# Patient Record
(demographics unavailable — no encounter records)

---

## 2025-03-11 NOTE — DISCUSSION/SUMMARY
[Parental Well-Being] : parental well-being [Family Adjustment] : family adjustment [Feeding Routines] : feeding routines [Infant Adjustment] : infant adjustment [Safety] : safety [Age Approp Vaccines] : Age appropriate vaccines administered [Parent/Guardian] : Parent/Guardian [Parental Concerns Addressed] : Parental concerns addressed [] : The components of the vaccine(s) to be administered today are listed in the plan of care. The disease(s) for which the vaccine(s) are intended to prevent and the risks have been discussed with the caretaker.  The risks are also included in the appropriate vaccination information statements which have been provided to the patient's caregiver.  The caregiver has given consent to vaccinate. [FreeTextEntry1] : resources provided for Mom . denies SI/ HI   independent historian parent  Records reviewed. Fully counseled. All questions and concerns addressed.

## 2025-03-11 NOTE — HISTORY OF PRESENT ILLNESS
[Parents] : parents [Breast milk] : breast milk [Normal] : Normal [In Bassinet/Crib] : sleeps in bassinet/crib [On back] : sleeps on back [No] : No cigarette smoke exposure [Water heater temperature set at <120 degrees F] : Water heater temperature set at <120 degrees F [Rear facing car seat in back seat] : Rear facing car seat in back seat [Carbon Monoxide Detectors] : Carbon monoxide detectors at home [Smoke Detectors] : Smoke detectors at home. [At risk for exposure to TB] : Not at risk for exposure to Tuberculosis  [de-identified] : 2.5oz  [NO] : No [FreeTextEntry1] : PT HERE FOR 1 MO [place] - HEP B #2  DOING WELL OVERALL  BREASTMILK + ENFAMIL GENTLEASE LIQUID

## 2025-03-11 NOTE — PHYSICAL EXAM
[Alert] : alert [Acute Distress] : no acute distress [Normocephalic] : normocephalic [Flat Open Anterior [place]] : flat open anterior fontanelle [PERRL] : PERRL [Red Reflex Bilateral] : red reflex bilateral [Normally Placed Ears] : normally placed ears [Auricles Well Formed] : auricles well formed [Clear Tympanic membranes] : clear tympanic membranes [Light reflex present] : light reflex present [Bony landmarks visible] : bony landmarks visible [Discharge] : no discharge [Nares Patent] : nares patent [Palate Intact] : palate intact [Uvula Midline] : uvula midline [Supple, full passive range of motion] : supple, full passive range of motion [Palpable Masses] : no palpable masses [Symmetric Chest Rise] : symmetric chest rise [Clear to Auscultation Bilaterally] : clear to auscultation bilaterally [Regular Rate and Rhythm] : regular rate and rhythm [S1, S2 present] : S1, S2 present [Murmurs] : no murmurs [+2 Femoral Pulses] : +2 femoral pulses [Soft] : soft [Tender] : nontender [Distended] : not distended [Bowel Sounds] : bowel sounds present [Hepatomegaly] : no hepatomegaly [Splenomegaly] : no splenomegaly [Normal external genitailia] : normal external genitalia [Central Urethral Opening] : central urethral opening [Testicles Descended Bilaterally] : testicles descended bilaterally [Normally Placed] : normally placed [No Abnormal Lymph Nodes Palpated] : no abnormal lymph nodes palpated [Mills-Ortolani] : negative Mills-Ortolani [Symmetric Flexed Extremities] : symmetric flexed extremities [Spinal Dimple] : no spinal dimple [Tuft of Hair] : no tuft of hair [Startle Reflex] : startle reflex present [Suck Reflex] : suck reflex present [Rooting] : rooting reflex present [Palmar Grasp] : palmar grasp reflex present [Plantar Grasp] : plantar grasp reflex present [Symmetric Claire] : symmetric [place] [Jaundice] : no jaundice [Rash and/or lesion present] : no rash/lesion

## 2025-03-27 NOTE — HISTORY OF PRESENT ILLNESS
[FreeTextEntry6] : PT IS 57DAYS OLD, HERE WITH MOM  PT IS HERE FOR SICK VISIT C/O ORAL THRUSH, & COUGH NO TEMP BUT MOM REPORTS WARM TO TOUCH  MOM NOTICED THRUSH 2-3 DAYS AGO

## 2025-03-27 NOTE — DISCUSSION/SUMMARY
[FreeTextEntry1] : White plaques likely oral thrush. Recommend nystatin up to 4 times per day. Sterilize bottles and nipples.   Discussed findings. F/U if symptoms persist or worsen  Counseled fully.  Prework: Reviewed prior notes, reports, and results.   Independent historian; parent.

## 2025-04-01 NOTE — PHYSICAL EXAM
[Alert] : alert [Acute Distress] : no acute distress [Normocephalic] : normocephalic [Flat Open Anterior [place]] : flat open anterior fontanelle [PERRL] : PERRL [Red Reflex Bilateral] : red reflex bilateral [Normally Placed Ears] : normally placed ears [Auricles Well Formed] : auricles well formed [Clear Tympanic membranes] : clear tympanic membranes [Light reflex present] : light reflex present [Bony landmarks visible] : bony landmarks visible [Discharge] : no discharge [Nares Patent] : nares patent [Palate Intact] : palate intact [Uvula Midline] : uvula midline [Supple, full passive range of motion] : supple, full passive range of motion [Palpable Masses] : no palpable masses [Symmetric Chest Rise] : symmetric chest rise [Clear to Auscultation Bilaterally] : clear to auscultation bilaterally [Regular Rate and Rhythm] : regular rate and rhythm [S1, S2 present] : S1, S2 present [Murmurs] : no murmurs [+2 Femoral Pulses] : +2 femoral pulses [Soft] : soft [Tender] : nontender [Distended] : not distended [Bowel Sounds] : bowel sounds present [Hepatomegaly] : no hepatomegaly [Splenomegaly] : no splenomegaly [Normal external genitailia] : normal external genitalia [Central Urethral Opening] : central urethral opening [Testicles Descended Bilaterally] : testicles descended bilaterally [Normally Placed] : normally placed [No Abnormal Lymph Nodes Palpated] : no abnormal lymph nodes palpated [Mills-Ortolani] : negative Mills-Ortolani [Symmetric Flexed Extremities] : symmetric flexed extremities [Spinal Dimple] : no spinal dimple [Tuft of Hair] : no tuft of hair [Startle Reflex] : startle reflex present [Suck Reflex] : suck reflex present [Rooting] : rooting reflex present [Palmar Grasp] : palmar grasp reflex present [Plantar Grasp] : plantar grasp reflex present [Symmetric Claire] : symmetric [place] [Rash and/or lesion present] : no rash/lesion [de-identified] : +white plaques on tongue

## 2025-04-01 NOTE — HISTORY OF PRESENT ILLNESS
[Mother] : mother [Breast milk] : breast milk [Normal] : Normal [In Bassinet/Crib] : sleeps in bassinet/crib [On back] : sleeps on back [No] : No cigarette smoke exposure [Water heater temperature set at <120 degrees F] : Water heater temperature set at <120 degrees F [Rear facing car seat in back seat] : Rear facing car seat in back seat [Carbon Monoxide Detectors] : Carbon monoxide detectors at home [Smoke Detectors] : Smoke detectors at home. [At risk for exposure to TB] : Not at risk for exposure to Tuberculosis  [de-identified] : 3oz  [NO] : No [FreeTextEntry1] : PT HERE FOR 2 MO [place] - PENTACEL AND ROTAVIRUS DOING WELL OVERALL

## 2025-04-01 NOTE — DISCUSSION/SUMMARY
[Parental (Maternal) Well-Being] : parental (maternal) well-being [Infant-Family Synchrony] : infant-family synchrony [Nutritional Adequacy] : nutritional adequacy [Infant Behavior] : infant behavior [Safety] : safety [Parental Concerns Addressed] : Parental concerns addressed [] : The components of the vaccine(s) to be administered today are listed in the plan of care. The disease(s) for which the vaccine(s) are intended to prevent and the risks have been discussed with the caretaker.  The risks are also included in the appropriate vaccination information statements which have been provided to the patient's caregiver.  The caregiver has given consent to vaccinate. [FreeTextEntry1] :  independent historian parent  Records reviewed. Fully counseled. All questions and concerns addressed.  continue vitamin d drops

## 2025-04-29 NOTE — PHYSICAL EXAM
[Cerumen in canal] : cerumen in canal [Mucoid Discharge] : mucoid discharge [NL] : nonerythematous oropharynx [FreeTextEntry3] : WAX REMOVAL PERFORMED. BLOOD IN RIGHT CANAL NOTED  [de-identified] : NOTED YELLOWISH BUILD UP UNDER LEFT INDEX FINGERNAIL. REF DERM

## 2025-04-29 NOTE — DISCUSSION/SUMMARY
[FreeTextEntry1] : Counseled fully. PREWORK: Reviewed prior notes, reports, and results. Independent historian; parent.  Patient presents with parent for sick visit c/o FEVER, COUGH AND CONGESTION  *MOM POINTED OUT INDEX FINGERNAIL WITH YELLOWISH BUILDUP UNDERNEATH. REPORTS BEING THERE FOR MONTHS. REF DERM  RAPID FLU: NEGATIVE  SWABBED RVP IN OFFICE  WILL FOLLOW UP WITH RESULTS IN 48 HOURS  100 percent physician-engaged. Full ventilation. [facility] endorsed Isolation precautions. PPE in use.  Advised to continue monitoring temperature and treat PRN with Tylenol or Motrin.  Ensure adequate hydration with Pedialyte or Gatorade. Keep patient comfortable.  Will be contagious until 24hrs fever free.   Symptoms likely due to viral URI. Recommend supportive care including antipyretics, fluids, and nasal saline followed by nasal suction. Return if symptoms worsen or persist.  REC CONTINUE WITH SUPPORTIVE CARE.

## 2025-05-02 NOTE — HISTORY OF PRESENT ILLNESS
[Parents] : parents [Breast milk] : breast milk [Formula ___ oz/feed] : [unfilled] oz of formula per feed [Hours between feeds ___] : Child is fed every [unfilled] hours [Normal] : Normal [Frequency of stools: ___] : Frequency of stools: [unfilled]  stools [In Bassinet/Crib] : sleeps in bassinet/crib [On back] : sleeps on back [Co-sleeping] : co-sleeping [Pacifier use] : Pacifier use [Rear facing car seat in back seat] : Rear facing car seat in back seat [Carbon Monoxide Detectors] : Carbon monoxide detectors at home [Smoke Detectors] : Smoke detectors at home. [NO] : No [At risk for exposure to TB] : Not at risk for exposure to Tuberculosis  [de-identified] : No concerns [de-identified] : Prevnar [FreeTextEntry1] : PT IS HERE WITH MOM AND DAD   PT IS HERE FOR 3 MONTH [place]---> Prevnar  NO FEVER  CONGESTION  COUGH  PT WAS IN OFFICE 4/29 ALL TESTS DONE IN OFFICE WAS NEGATIVE   EATING SLEEPING POOPING NORMAL   ENFAMIL GENTLEASE + BREAST MILK

## 2025-05-02 NOTE — PHYSICAL EXAM
[Alert] : alert [Normocephalic] : normocephalic [Flat Open Anterior [place]] : flat open anterior fontanelle [PERRL] : PERRL [Red Reflex Bilateral] : red reflex bilateral [Normally Placed Ears] : normally placed ears [Auricles Well Formed] : auricles well formed [Clear Tympanic membranes] : clear tympanic membranes [Light reflex present] : light reflex present [Bony landmarks visible] : bony landmarks visible [Nares Patent] : nares patent [Palate Intact] : palate intact [Uvula Midline] : uvula midline [Supple, full passive range of motion] : supple, full passive range of motion [Symmetric Chest Rise] : symmetric chest rise [Clear to Auscultation Bilaterally] : clear to auscultation bilaterally [Regular Rate and Rhythm] : regular rate and rhythm [S1, S2 present] : S1, S2 present [+2 Femoral Pulses] : +2 femoral pulses [Soft] : soft [Bowel Sounds] : bowel sounds present [Normal external genitalia] : normal external genitalia [Central Urethral Opening] : central urethral opening [Testicles Descended Bilaterally] : testicles descended bilaterally [Normally Placed] : normally placed [No Abnormal Lymph Nodes Palpated] : no abnormal lymph nodes palpated [Symmetric Flexed Extremities] : symmetric flexed extremities [Startle Reflex] : startle reflex present [Suck Reflex] : suck reflex present [Rooting] : rooting reflex present [Palmar Grasp] : palmar grasp reflex present [Plantar Grasp] : plantar grasp reflex present [Symmetric [place]] : symmetric [place] [Acute Distress] : no acute distress [Discharge] : no discharge [Palpable Masses] : no palpable masses [Murmurs] : no murmurs [Tender] : nontender [Distended] : not distended [Hepatomegaly] : no hepatomegaly [Splenomegaly] : no splenomegaly [Mills-Ortolani] : negative Mills-Ortolani [Spinal Dimple] : no spinal dimple [Tuft of Hair] : no tuft of hair [Rash and/or lesion present] : no rash/lesion

## 2025-05-02 NOTE — DISCUSSION/SUMMARY
[Normal Growth] : growth [Normal Development] : development  [No Elimination Concerns] : elimination [Continue Regimen] : feeding [No Skin Concerns] : skin [Normal Sleep Pattern] : sleep [None] : no medical problems [Anticipatory Guidance Given] : Anticipatory guidance addressed as per the history of present illness section [Age Approp Vaccines] : Age appropriate vaccines administered [No Medications] : ~He/She~ is not on any medications [Parent/Guardian] : Parent/Guardian [] : The components of the vaccine(s) to be administered today are listed in the plan of care. The disease(s) for which the vaccine(s) are intended to prevent and the risks have been discussed with the caretaker.  The risks are also included in the appropriate vaccination information statements which have been provided to the patient's caregiver.  The caregiver has given consent to vaccinate. [Parental (Maternal) Well-Being] : parental (maternal) well-being [Infant-Family Synchrony] : infant-family synchrony [Nutritional Adequacy] : nutritional adequacy [Infant Behavior] : infant behavior [Safety] : safety [FreeTextEntry1] : Recommend exclusive breastfeeding, 8-12 feedings per day. Mother should continue prenatal vitamins and avoid alcohol. If formula is needed, recommend iron-fortified formulations, 2-4 oz every 3-4 hrs. When in car, patient should be in rear-facing car seat in back seat. Put baby to sleep on back, in own crib with no loose or soft bedding. Help baby to maintain sleep and feeding routines. May offer pacifier if needed. Continue tummy time when awake. Parents counseled to call if rectal temperature >100.4 degrees F.  Symptoms likely due to viral URI. Recommend supportive care including antipyretics, fluids, and nasal saline followed by nasal suction. Return if symptoms worsen or persist.   Discussed findings. F/U if symptoms persist or worsen  Counseled fully.  Prework: Reviewed prior notes, reports, and results.   Independent historian; parent.

## 2025-05-02 NOTE — DEVELOPMENTAL MILESTONES
[Normal Development] : Normal Development [None] : none [Smiles responsively] : smiles responsively [Vocalizes with simple cooing] : vocalizes with simple cooing [Lifts head and chest in prone] : lifts head and chest in prone [Opens and shuts hands] : opens and shuts hands [Laughs aloud] : laughs aloud [Turns to voice] : turns to voice [Vocalizes with extending cooing] : vocalizes with extending cooing [Keeps hands unfisted] : keeps hands unfisted

## 2025-05-09 NOTE — PHYSICAL EXAM
[NL] : no acute distress, alert [Clear] : right tympanic membrane clear [FreeTextEntry3] : Rt external canal with excessive dark ear wax, no scabs or bleeding

## 2025-05-09 NOTE — DISCUSSION/SUMMARY
[FreeTextEntry1] : reassured Mom that ear wax is dark and its not blood.  don't use q tips or place anything in the ears    independent historian parent  Records reviewed. Fully counseled. All questions and concerns addressed.

## 2025-05-09 NOTE — HISTORY OF PRESENT ILLNESS
[FreeTextEntry6] : PT IS HERE WITH MOM  PT IS HERE FOR BLEEDING IN LEFT EAR  MOM STATES THIS HAS BEEN GOING ON FOR A FEW DAYS  NO FEVER  MOM STATES DR KNIGHT USED A COTTON SWAB IN THE EARS AND WHEN PT GOT HOME LEFT EAR WAS BLEEDING

## 2025-05-16 NOTE — HISTORY OF PRESENT ILLNESS
[FreeTextEntry1] : NPV nail changes [de-identified] : NORM CHOWDHURY is a 3 month old who is presenting for evaluation of:   1. Changes to the left 2nd finger nail. Ongoing for at least 1 month. Previously was red. Parents are unclear if there was a preceding infection, deny injury. No pets at home. No exposure to tinea. Baby is not bothered by fingernail.

## 2025-05-16 NOTE — PHYSICAL EXAM
[Alert] : alert [Well Nourished] : well nourished [Conjunctiva Non-injected] : conjunctiva non-injected [No Visual Lymphadenopathy] : no visual  lymphadenopathy [No Clubbing] : no clubbing [No Edema] : no edema [No Bromhidrosis] : no bromhidrosis [No Chromhidrosis] : no chromhidrosis [FreeTextEntry3] : left 2nd fingernail with thickening and ragged edge and yellowing discoloration all other fingers and toes normal

## 2025-05-16 NOTE — CONSULT LETTER
[Dear  ___] : Dear  [unfilled], [Consult Letter:] : I had the pleasure of evaluating your patient, [unfilled]. [Please see my note below.] : Please see my note below. [Consult Closing:] : Thank you very much for allowing me to participate in the care of this patient.  If you have any questions, please do not hesitate to contact me. [Sincerely,] : Sincerely, [FreeTextEntry3] : Yani Scales MD Pediatric Dermatology [facility]

## 2025-05-16 NOTE — ASSESSMENT
[Use of independent historian: [ enter independent historian's relationship to patient ] :____] : As the patient was unable to provide a complete and reliable history, I obtained clinical history from the patient's [unfilled] [FreeTextEntry1] : #Onychodystrophy- sometime can be seen with trauma or prior candida Nail clipping and fungal swab obtained to r/o onychomycosis Start nystatin ointment BID to AA   xerosis Education and anticipatory guidance provided. Dry skin care handout reviewed  RTC 1 month

## 2025-05-16 NOTE — HISTORY OF PRESENT ILLNESS
[FreeTextEntry1] : NPV nail changes [de-identified] : NORM CHOWDHURY is a 3 month old who is presenting for evaluation of:   1. Changes to the left 2nd finger nail. Ongoing for at least 1 month. Previously was red. Parents are unclear if there was a preceding infection, deny injury. No pets at home. No exposure to tinea. Baby is not bothered by fingernail.

## 2025-05-30 NOTE — HISTORY OF PRESENT ILLNESS
[Parents] : parents [Well-balanced] : well-balanced [Breast milk] : breast milk [Formula ___ oz/feed] : [unfilled] oz of formula per feed [Hours between feeds ___] : Child is fed every [unfilled] hours [Normal] : Normal [In Bassinet/Crib] : sleeps in bassinet/crib [On back] : sleeps on back [Sleeps 12-16 hours per 24 hours (including naps)] : sleeps 12-16 hours per 24 hours (including naps) [Pacifier use] : Pacifier use [Tummy time] : tummy time [No] : No cigarette smoke exposure [Water heater temperature set at <120 degrees F] : Water heater temperature set at <120 degrees F [Rear facing car seat in back seat] : Rear facing car seat in back seat [Carbon Monoxide Detectors] : Carbon monoxide detectors at home [Smoke Detectors] : Smoke detectors at home. [NO] : No [Co-sleeping] : no co-sleeping [Loose bedding, pillow, toys, and/or bumpers in crib] : no loose bedding, pillow, toys, and/or bumpers in crib [de-identified] : Stuffy nose and cough [de-identified] : Pent / roto [FreeTextEntry1] : PT IS HERE WITH MOM AND DAD   PT IS HERE FOR 4 MONTH [place] --> ROTA + PENTACEL   EATING SLEEPING POOPING NORMAL   DRINKING BREAST MILK + ENFAMIL GENTLEASE   RUNNY NOSE AND COUGH FOR A MONTH NO FEVER

## 2025-05-30 NOTE — PHYSICAL EXAM
[Alert] : alert [Acute Distress] : no acute distress [Normocephalic] : normocephalic [Flat Open Anterior [place]] : flat open anterior fontanelle [Red Reflex] : red reflex bilateral [PERRL] : PERRL [Normally Placed Ears] : normally placed ears [Auricles Well Formed] : auricles well formed [Clear Tympanic membranes] : clear tympanic membranes [Light reflex present] : light reflex present [Bony landmarks visible] : bony landmarks visible [Discharge] : discharge [Nares Patent] : nares patent [Palate Intact] : palate intact [Uvula Midline] : uvula midline [Palpable Masses] : no palpable masses [Symmetric Chest Rise] : symmetric chest rise [Clear to Auscultation Bilaterally] : clear to auscultation bilaterally [Regular Rate and Rhythm] : regular rate and rhythm [S1, S2 present] : S1, S2 present [Murmurs] : no murmurs [+2 Femoral Pulses] : (+) 2 femoral pulses [Soft] : soft [Tender] : nontender [Distended] : nondistended [Bowel Sounds] : bowel sounds present [Hepatomegaly] : no hepatomegaly [Splenomegaly] : no splenomegaly [Central Urethral Opening] : central urethral opening [Testicles Descended] : testicles descended bilaterally [Patent] : patent [Normally Placed] : normally placed [No Abnormal Lymph Nodes Palpated] : no abnormal lymph nodes palpated [Mills-Ortolani] : negative Mills-Ortolani [Allis Sign] : negative Allis sign [Spinal Dimple] : no spinal dimple [Tuft of Hair] : no tuft of hair [Startle Reflex] : startle reflex present [Plantar Grasp] : plantar grasp reflex present [Symmetric [place]] : symmetric [place] [Rash or Lesions] : no rash/lesions [FreeTextEntry4] : clear nasal discharge

## 2025-05-30 NOTE — DISCUSSION/SUMMARY
[Normal Growth] : growth [Normal Development] : development  [No Elimination Concerns] : elimination [Continue Regimen] : feeding [No Skin Concerns] : skin [Normal Sleep Pattern] : sleep [Anticipatory Guidance Given] : Anticipatory guidance addressed as per the history of present illness section [Family Functioning] : family functioning [Nutritional Adequacy and Growth] : nutritional adequacy and growth [Infant Development] : infant development [Oral Health] : oral health [Safety] : safety [Age Approp Vaccines] : Age appropriate vaccines administered [No Medications] : ~He/She~ is not on any medications [Parent/Guardian] : Parent/Guardian [] : The components of the vaccine(s) to be administered today are listed in the plan of care. The disease(s) for which the vaccine(s) are intended to prevent and the risks have been discussed with the caretaker.  The risks are also included in the appropriate vaccination information statements which have been provided to the patient's caregiver.  The caregiver has given consent to vaccinate. [FreeTextEntry1] : Recommend breastfeeding, 8-12 feedings per day. Mother should continue prenatal vitamins and avoid alcohol. If formula is needed, recommend iron-fortified formulations, 2-4 oz every 3-4 hrs. Cereal may be introduced using a spoon and bowl. When in car, patient should be in rear-facing car seat in back seat. Put baby to sleep on back, in own crib with no loose or soft bedding. Lower crib matress. Help baby to maintain sleep and feeding routines. May offer pacifier if needed. Continue tummy time when awake.

## 2025-06-20 NOTE — PHYSICAL EXAM
[Alert] : alert [Well Nourished] : well nourished [Conjunctiva Non-injected] : conjunctiva non-injected [No Visual Lymphadenopathy] : no visual  lymphadenopathy [No Clubbing] : no clubbing [No Edema] : no edema [No Bromhidrosis] : no bromhidrosis [No Chromhidrosis] : no chromhidrosis [FreeTextEntry3] : No abnormality in any finger nails

## 2025-06-20 NOTE — ASSESSMENT
[Use of independent historian: [ enter independent historian's relationship to patient ] :____] : As the patient was unable to provide a complete and reliable history, I obtained clinical history from the patient's [unfilled] [FreeTextEntry1] : #Onychodystrophy - resolved - no need for further tx  Education and anticipatory guidance provided.  xerosis Education and anticipatory guidance provided.   RTC PRN

## 2025-06-20 NOTE — CONSULT LETTER
[Dear  ___] : Dear  [unfilled], [Consult Letter:] : I had the pleasure of evaluating your patient, [unfilled]. [Please see my note below.] : Please see my note below. [Consult Closing:] : Thank you very much for allowing me to participate in the care of this patient.  If you have any questions, please do not hesitate to contact me. [Sincerely,] : Sincerely, [FreeTextEntry3] : Yani Scales MD Pediatric Dermatology [facility]

## 2025-06-20 NOTE — HISTORY OF PRESENT ILLNESS
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 Ongoing for at least 1 month. Previously was red. Parents are unclear if there was a preceding infection, deny injury. No pets at home. No exposure to tinea. Baby is not bothered by fingernail.

## 2025-06-20 NOTE — HISTORY OF PRESENT ILLNESS
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 Ongoing for at least 1 month. Previously was red. Parents are unclear if there was a preceding infection, deny injury. No pets at home. No exposure to tinea. Baby is not bothered by fingernail.

## 2025-06-20 NOTE — CONSULT LETTER
[Dear  ___] : Dear  [unfilled], [Consult Letter:] : I had the pleasure of evaluating your patient, [unfilled]. [Please see my note below.] : Please see my note below. [Consult Closing:] : Thank you very much for allowing me to participate in the care of this patient.  If you have any questions, please do not hesitate to contact me. [Sincerely,] : Sincerely, [FreeTextEntry3] : Yani Scales MD Pediatric Dermatology [facility]

## 2025-07-13 NOTE — DISCUSSION/SUMMARY
[] : The components of the vaccine(s) to be administered today are listed in the plan of care. The disease(s) for which the vaccine(s) are intended to prevent and the risks have been discussed with the caretaker.  The risks are also included in the appropriate vaccination information statements which have been provided to the patient's caregiver.  The caregiver has given consent to vaccinate. [FreeTextEntry1] : Counseled fully. PREWORK: Reviewed prior notes, reports, and results. Independent historian; parent.  NUTRITION: Provided "Started Solids" information sheet. Discussed starting cereals. ENFAMIL GENTLEASE.  MOM REPORTS FEEDING 4OZ EVERY 2-3HRS. REC EXTENDING TO 4HR FEEDS & START OATMEAL TWICE DAILY.  ELIMINATION: DOING WELL  SLEEPING: DOING WELL  DEVELOPMENT: reaching out/grasping/turning well.   Recommend breastfeeding, 8-12 feedings per day. Mother should continue prenatal vitamins and avoid alcohol. If formula is needed, recommend iron-fortified formulations, 2-4 oz every 3-4 hrs. Cereal may be introduced using a spoon and bowl. When in car, patient should be in rear-facing car seat in back seat. Put baby to sleep on back, in own crib with no loose or soft bedding. Lower crib mattress. Help baby to maintain sleep and feeding routines. May offer pacifier if needed. Continue tummy time when awake.  RTO FOR 6 MO [place]. ROTA & PENTACEL.

## 2025-07-13 NOTE — HISTORY OF PRESENT ILLNESS
[FreeTextEntry6] : PT IS HERE WITH MOM  PT IS HERE FOR 5 MONTH [place] --> PCV  EATING SLEEPING POOPING NORMAL  ENFAMIL GENTLEASE